# Patient Record
Sex: FEMALE | Race: WHITE | NOT HISPANIC OR LATINO | Employment: OTHER | URBAN - METROPOLITAN AREA
[De-identification: names, ages, dates, MRNs, and addresses within clinical notes are randomized per-mention and may not be internally consistent; named-entity substitution may affect disease eponyms.]

---

## 2017-09-26 ENCOUNTER — CONVERSION ENCOUNTER (OUTPATIENT)
Dept: RADIOLOGY | Facility: IMAGING CENTER | Age: 72
End: 2017-09-26

## 2018-12-04 ENCOUNTER — TRANSCRIBE ORDERS (OUTPATIENT)
Dept: ADMINISTRATIVE | Facility: HOSPITAL | Age: 73
End: 2018-12-04

## 2018-12-04 DIAGNOSIS — Z12.39 BREAST SCREENING: Primary | ICD-10-CM

## 2019-01-07 ENCOUNTER — HOSPITAL ENCOUNTER (OUTPATIENT)
Dept: RADIOLOGY | Age: 74
Discharge: HOME | End: 2019-01-07
Payer: COMMERCIAL

## 2019-01-07 VITALS — HEIGHT: 59 IN | WEIGHT: 112 LBS | BODY MASS INDEX: 22.58 KG/M2

## 2019-01-07 DIAGNOSIS — Z12.39 BREAST SCREENING: ICD-10-CM

## 2019-01-07 PROCEDURE — 77063 BREAST TOMOSYNTHESIS BI: CPT

## 2019-01-07 PROCEDURE — 77067 SCR MAMMO BI INCL CAD: CPT

## 2020-10-07 ENCOUNTER — TRANSCRIBE ORDERS (OUTPATIENT)
Dept: ADMINISTRATIVE | Facility: HOSPITAL | Age: 75
End: 2020-10-07

## 2020-10-07 DIAGNOSIS — Z12.31 SCREENING MAMMOGRAM FOR HIGH-RISK PATIENT: Primary | ICD-10-CM

## 2021-01-04 ENCOUNTER — HOSPITAL ENCOUNTER (OUTPATIENT)
Dept: MAMMOGRAPHY | Facility: CLINIC | Age: 76
Discharge: HOME | End: 2021-01-04
Payer: COMMERCIAL

## 2021-01-04 VITALS — HEIGHT: 59 IN | WEIGHT: 116 LBS | BODY MASS INDEX: 23.39 KG/M2

## 2021-01-04 DIAGNOSIS — Z12.31 ENCOUNTER FOR SCREENING MAMMOGRAM FOR MALIGNANT NEOPLASM OF BREAST: ICD-10-CM

## 2021-01-04 DIAGNOSIS — Z12.31 SCREENING MAMMOGRAM FOR HIGH-RISK PATIENT: ICD-10-CM

## 2021-01-04 PROCEDURE — 77067 SCR MAMMO BI INCL CAD: CPT

## 2021-01-04 PROCEDURE — 77063 BREAST TOMOSYNTHESIS BI: CPT

## 2023-07-13 ENCOUNTER — HOSPITAL ENCOUNTER (OUTPATIENT)
Dept: MAMMOGRAPHY | Facility: CLINIC | Age: 78
End: 2023-07-13
Payer: COMMERCIAL

## 2023-07-13 VITALS — BODY MASS INDEX: 24.19 KG/M2 | HEIGHT: 59 IN | WEIGHT: 120 LBS

## 2023-07-13 DIAGNOSIS — Z12.31 ENCOUNTER FOR SCREENING MAMMOGRAM FOR MALIGNANT NEOPLASM OF BREAST: ICD-10-CM

## 2023-07-13 PROCEDURE — 77067 SCR MAMMO BI INCL CAD: CPT

## 2023-07-13 PROCEDURE — 77063 BREAST TOMOSYNTHESIS BI: CPT

## 2023-08-11 ENCOUNTER — NURSING HOME VISIT (OUTPATIENT)
Dept: GERIATRICS | Facility: OTHER | Age: 78
End: 2023-08-11
Payer: COMMERCIAL

## 2023-08-11 DIAGNOSIS — R19.7 DIARRHEA, UNSPECIFIED TYPE: ICD-10-CM

## 2023-08-11 DIAGNOSIS — I10 ESSENTIAL HYPERTENSION: ICD-10-CM

## 2023-08-11 DIAGNOSIS — J18.9 PNEUMONIA DUE TO INFECTIOUS ORGANISM, UNSPECIFIED LATERALITY, UNSPECIFIED PART OF LUNG: Primary | ICD-10-CM

## 2023-08-11 DIAGNOSIS — F41.1 ANXIETY STATE: ICD-10-CM

## 2023-08-11 DIAGNOSIS — M05.79 RHEUMATOID ARTHRITIS INVOLVING MULTIPLE SITES WITH POSITIVE RHEUMATOID FACTOR (HCC): ICD-10-CM

## 2023-08-11 DIAGNOSIS — D64.9 ANEMIA, UNSPECIFIED TYPE: ICD-10-CM

## 2023-08-11 DIAGNOSIS — R53.1 GENERALIZED WEAKNESS: ICD-10-CM

## 2023-08-11 DIAGNOSIS — D47.2 SMOLDERING MYELOMA: ICD-10-CM

## 2023-08-11 DIAGNOSIS — K21.9 GASTROESOPHAGEAL REFLUX DISEASE, UNSPECIFIED WHETHER ESOPHAGITIS PRESENT: ICD-10-CM

## 2023-08-11 DIAGNOSIS — N17.9 AKI (ACUTE KIDNEY INJURY) (HCC): ICD-10-CM

## 2023-08-11 PROBLEM — M06.9: Status: ACTIVE | Noted: 2022-11-17

## 2023-08-11 PROBLEM — M17.10 ARTHRITIS OF KNEE: Status: ACTIVE | Noted: 2022-04-14

## 2023-08-11 PROBLEM — M25.50 MULTIPLE JOINT PAIN: Status: ACTIVE | Noted: 2021-11-16

## 2023-08-11 PROBLEM — R00.0 TACHYCARDIA: Status: ACTIVE | Noted: 2021-12-06

## 2023-08-11 PROBLEM — N25.81 HYPERPARATHYROIDISM DUE TO RENAL INSUFFICIENCY (HCC): Status: ACTIVE | Noted: 2019-11-14

## 2023-08-11 PROBLEM — R74.01 ELEVATED AST (SGOT): Status: ACTIVE | Noted: 2023-08-02

## 2023-08-11 PROBLEM — R74.8 ALKALINE PHOSPHATASE ELEVATION: Status: ACTIVE | Noted: 2023-08-02

## 2023-08-11 PROBLEM — R77.8 ELEVATED TROPONIN: Status: ACTIVE | Noted: 2023-08-02

## 2023-08-11 PROBLEM — N18.1 STAGE 1 CHRONIC KIDNEY DISEASE: Status: ACTIVE | Noted: 2019-11-14

## 2023-08-11 PROBLEM — A41.9 SEPSIS (HCC): Status: ACTIVE | Noted: 2023-08-03

## 2023-08-11 PROBLEM — R06.02 SOB (SHORTNESS OF BREATH): Status: ACTIVE | Noted: 2023-08-02

## 2023-08-11 PROBLEM — R30.0 DIFFICULT OR PAINFUL URINATION: Status: ACTIVE | Noted: 2021-12-06

## 2023-08-11 PROBLEM — G89.29 CHRONIC LOW BACK PAIN: Status: ACTIVE | Noted: 2021-11-16

## 2023-08-11 PROBLEM — M17.0 PRIMARY OSTEOARTHRITIS OF BOTH KNEES: Status: ACTIVE | Noted: 2022-01-04

## 2023-08-11 PROBLEM — N26.1 ATROPHY OF KIDNEY: Status: ACTIVE | Noted: 2019-11-14

## 2023-08-11 PROBLEM — M54.50 CHRONIC LOW BACK PAIN: Status: ACTIVE | Noted: 2021-11-16

## 2023-08-11 PROBLEM — C90.00 MULTIPLE MYELOMA NOT HAVING ACHIEVED REMISSION (HCC): Status: ACTIVE | Noted: 2023-04-14

## 2023-08-11 PROBLEM — M51.36 DDD (DEGENERATIVE DISC DISEASE), LUMBAR: Status: ACTIVE | Noted: 2022-01-04

## 2023-08-11 PROBLEM — M85.89 OSTEOPENIA OF MULTIPLE SITES: Status: ACTIVE | Noted: 2022-08-30

## 2023-08-11 PROBLEM — E04.1 THYROID NODULE: Status: ACTIVE | Noted: 2022-10-04

## 2023-08-11 PROBLEM — E55.9 VITAMIN D DEFICIENCY: Status: ACTIVE | Noted: 2019-11-14

## 2023-08-11 PROCEDURE — 99306 1ST NF CARE HIGH MDM 50: CPT | Performed by: INTERNAL MEDICINE

## 2023-08-11 RX ORDER — LOSARTAN POTASSIUM 50 MG/1
50 TABLET ORAL DAILY
COMMUNITY
Start: 2023-07-28

## 2023-08-11 RX ORDER — DIMENHYDRINATE 50 MG
1000 TABLET ORAL DAILY
COMMUNITY

## 2023-08-11 RX ORDER — HYDROXYCHLOROQUINE SULFATE 200 MG/1
300 TABLET, FILM COATED ORAL DAILY
COMMUNITY
Start: 2023-02-28

## 2023-08-11 RX ORDER — FERROUS SULFATE 325(65) MG
325 TABLET ORAL
COMMUNITY

## 2023-08-11 RX ORDER — FAMOTIDINE 20 MG/1
1 TABLET, FILM COATED ORAL DAILY
COMMUNITY
Start: 2023-06-12

## 2023-08-11 RX ORDER — ACETAMINOPHEN 500 MG
650 TABLET ORAL 2 TIMES DAILY
COMMUNITY

## 2023-08-11 RX ORDER — CARVEDILOL 6.25 MG/1
6.25 TABLET ORAL 2 TIMES DAILY
COMMUNITY
Start: 2023-07-12

## 2023-08-11 RX ORDER — AMOXICILLIN 500 MG
1 CAPSULE ORAL DAILY
COMMUNITY

## 2023-08-11 RX ORDER — AMLODIPINE BESYLATE 2.5 MG/1
1 TABLET ORAL DAILY
COMMUNITY

## 2023-08-11 NOTE — ASSESSMENT & PLAN NOTE
Cr. Improved with hydration  Continue current meds  Follows up with nephrology as out patient due to one kidney being atrophy   Daughter reports has poor intake

## 2023-08-11 NOTE — ASSESSMENT & PLAN NOTE
Continue current med  Continue monitoring symptoms   PT/OT eval  Mostly likely due to pneumonia and dehydration

## 2023-08-11 NOTE — PROGRESS NOTES
[facility] home notes  SHORT TERM REHAB       NAME: Tani Carvajal  AGE: 65 y.o. SEX: female    DATE OF ENCOUNTER: 8/11/2023    Assessment and Plan   Pneumonia due to infectious organism  Completed course of antibiotics   Continue to encourage deep breaths  Taper off of oxygen  Continue monitoring symptoms     Generalized weakness  Continue current med  Continue monitoring symptoms   PT/OT eval  Mostly likely due to pneumonia and dehydration    ROOPA (acute kidney injury) ([address])  Cr. Improved with hydration  Continue current meds  Follows up with nephrology as out patient due to one kidney being atrophy   Daughter reports has poor intake     Anxiety state  Continue with supportive care  Currently not on meds   Continue monitoring symptoms     Essential hypertension  BP meds adjusted  Continue current med  Continue monitoring BP    Rheumatoid arthritis involving multiple sites with positive rheumatoid factor ([address])  Continue current meds  Continue monitoring symptoms      Smoldering myeloma  Continue follow up with nephrology   Continue monitoring symptoms     GERD (gastroesophageal reflux disease)  Continue current med  Continue monitoring symptoms     Diarrhea  Seems to have resolved   Continue monitoring symptoms    Anemia  Hb 9.8  Start iron supplement as patient poor eating habits as per daughter   Will repeat CBC        Chief Complaint     No new complaints    History of Present Illness     66 yo female seen for admission to [address] [place] [address] after hospitalization. As per history obtained from patient she was out side working and her daughter reported that she looks dehydrated and brought her to the hospital. Patient also reports she recently had her BP medications changed and then having diarrhea but not sure if it was associated to each other. It was another reason she was dehydrated but diarrhea has now resolved. Daughter reports her mother has not been eating well or drinking well.  She states her mother has poor eating habits which maybe another reason she felt weak and dehydrated. Patient was managed for dehydration, ROOPA and pneumonia in the hospital then discharged to [address] [place] [address] for therapy. Reviewed labs and imaging reports from the hospital records.      PMHx     Past Medical History:   Diagnosis Date   • Anxiety    • Arthritis    • Hyperlipidemia    • Hypertension    • Macular degeneration    • Proteinuria    • Rheumatoid arthritis ([address])    • Smoldering multiple myeloma      Past Surgical History:   Procedure Laterality Date   • ABDOMINAL SACROCOLPOPEXY     • APPENDECTOMY     • CATARACT EXTRACTION     • COLPORRHAPHY     • HEMORRHOID SURGERY     • HYSTERECTOMY      age 71   • JOINT REPLACEMENT     • OOPHORECTOMY     • TONSILECTOMY AND ADNOIDECTOMY       Family History   Problem Relation Age of Onset   • Hypertension Mother    • Tuberculosis Father    • Emphysema Brother    • No Known Problems Son    • No Known Problems Daughter    • No Known Problems Maternal Grandmother    • No Known Problems Maternal Grandfather    • No Known Problems Paternal Grandmother    • No Known Problems Paternal Grandfather      Social History     Socioeconomic History   • Marital status: /Civil Union     Spouse name: None   • Number of children: None   • Years of education: None   • Highest education level: None   Occupational History   • None   Tobacco Use   • Smoking status: Never   • Smokeless tobacco: Never   Substance and Sexual Activity   • Alcohol use: Never   • Drug use: Never   • Sexual activity: None   Other Topics Concern   • None   Social History Narrative   • None     Social Determinants of Health     Financial Resource Strain: Not on file   Food Insecurity: Not on file   Transportation Needs: Not on file   Physical Activity: Not on file   Stress: Not on file   Social Connections: Not on file   Intimate Partner Violence: Not on file   Housing Stability: Not on file     Allergies   Allergen Reactions   • Celecoxib Other (See Comments) and Rash     Advised not to take by kidney specialist  Advised not to take by kidney specialist     • Ketorolac Other (See Comments)     Patient reports- -per Kidney specialist  Patient reports- -per Kidney specialist     • Codeine Other (See Comments)     Crying spell  Crying spell     • Oxycodone GI Intolerance       Review of Systems     Denies any pain or shortness of breath   All other review of system negative        Objective   Vital signs:  BP: 112/65  HR: 97  RR: 18  TEMP: 98.7F  SAT.: 98% on RA    PHYSICAL EXAM:  GENERAL: no acute distress  SKIN: warm, dry, no rash, no cyanosis  HEENT: normocephalic, atraumatic, no JVD, no Thyromegaly, no lymphadenopathy  LUNGS: CTA, no wheezing, no rales, expanded equally, no chest tenderness   HEART: normal rhythm, normal rate, no murmur, no gallop  ABDOMEN: soft non tender non distended bs+, no guarding or rebound tenderness  :  no suprapubic tenderness  MUSCULOSKELETAL: strength about 4+/5 all extremities, ROM within normal, no calf tenderness  NEUROLOGY: awake, alert, Ox3, unable to recall 3 object. CN2-12 intact. PSYCH: cooperative, pleasant. Pertinent Laboratory/Diagnostic Studies:  Recent labs and diagnostic tests reviewed in nursing home EMR    Current Medications   Medications reviewed and signed off on nursing home EMR.

## 2023-08-11 NOTE — ASSESSMENT & PLAN NOTE
Completed course of antibiotics   Continue to encourage deep breaths  Taper off of oxygen  Continue monitoring symptoms

## 2023-08-18 ENCOUNTER — NURSING HOME VISIT (OUTPATIENT)
Dept: GERIATRICS | Facility: OTHER | Age: 78
End: 2023-08-18
Payer: COMMERCIAL

## 2023-08-18 DIAGNOSIS — J18.9 PNEUMONIA DUE TO INFECTIOUS ORGANISM, UNSPECIFIED LATERALITY, UNSPECIFIED PART OF LUNG: ICD-10-CM

## 2023-08-18 DIAGNOSIS — D64.9 ANEMIA, UNSPECIFIED TYPE: ICD-10-CM

## 2023-08-18 DIAGNOSIS — R53.1 GENERALIZED WEAKNESS: Primary | ICD-10-CM

## 2023-08-18 DIAGNOSIS — N17.9 AKI (ACUTE KIDNEY INJURY) (HCC): ICD-10-CM

## 2023-08-18 PROCEDURE — 99309 SBSQ NF CARE MODERATE MDM 30: CPT | Performed by: INTERNAL MEDICINE

## 2023-08-18 NOTE — ASSESSMENT & PLAN NOTE
Repeat hb 9.7   No signs of bleeding   Continue iron supplement   Continue monitoring CBC as out patient

## 2023-08-18 NOTE — ASSESSMENT & PLAN NOTE
Seems to be doing better  Off of oxygen  Continue monitoring symptoms   Continue monitoring symptoms

## 2023-08-18 NOTE — PROGRESS NOTES
[facility] [address]  home notes  SHORT TERM REHAB       NAME: Lewis Pimentel  AGE: 65 y.o. SEX: female    DATE OF ENCOUNTER: 8/18/2023    Assessment and Plan   Generalized weakness  Multifactorial  Continues to work with therapy  Reviewed therapy notes  Discussed with therapy about patient  Continue with safeyty measures  Continue with fall precautions     Anemia  Repeat hb 9.7   No signs of bleeding   Continue iron supplement   Continue monitoring CBC as out patient     ROOPA (acute kidney injury) ([address])  Cr normal  Continue with current med  Continue follow up with nephrology as out patient     Pneumonia due to infectious organism  Seems to be doing better  Off of oxygen  Continue monitoring symptoms   Continue monitoring symptoms      Chief Complaint     No new complaints     History of Present Illness     64 yo female seen for follow up. Patient seen for generalized weakness, ROOPA and anemia. Reviewed nursing notes since last visit.      PMHx     Past Medical History:   Diagnosis Date   • Anxiety    • Arthritis    • Hyperlipidemia    • Hypertension    • Macular degeneration    • Proteinuria    • Rheumatoid arthritis ([address])    • Smoldering multiple myeloma      Past Surgical History:   Procedure Laterality Date   • ABDOMINAL SACROCOLPOPEXY     • APPENDECTOMY     • CATARACT EXTRACTION     • COLPORRHAPHY     • HEMORRHOID SURGERY     • HYSTERECTOMY      age 71   • JOINT REPLACEMENT     • OOPHORECTOMY     • TONSILECTOMY AND ADNOIDECTOMY       Family History   Problem Relation Age of Onset   • Hypertension Mother    • Tuberculosis Father    • Emphysema Brother    • No Known Problems Son    • No Known Problems Daughter    • No Known Problems Maternal Grandmother    • No Known Problems Maternal Grandfather    • No Known Problems Paternal Grandmother    • No Known Problems Paternal Grandfather      Social History     Socioeconomic History   • Marital status: /Civil Union     Spouse name: Not on file   • Number of children: Not on file   • Years of education: Not on file   • Highest education level: Not on file   Occupational History   • Not on file   Tobacco Use   • Smoking status: Never   • Smokeless tobacco: Never   Substance and Sexual Activity   • Alcohol use: Never   • Drug use: Never   • Sexual activity: Not on file   Other Topics Concern   • Not on file   Social History Narrative   • Not on file     Social Determinants of Health     Financial Resource Strain: Not on file   Food Insecurity: Not on file   Transportation Needs: Not on file   Physical Activity: Not on file   Stress: Not on file   Social Connections: Not on file   Intimate Partner Violence: Not on file   Housing Stability: Not on file     Allergies   Allergen Reactions   • Celecoxib Other (See Comments) and Rash     Advised not to take by kidney specialist  Advised not to take by kidney specialist     • Ketorolac Other (See Comments)     Patient reports- -per Kidney specialist  Patient reports- -per Kidney specialist     • Codeine Other (See Comments)     Crying spell  Crying spell     • Oxycodone GI Intolerance       Review of Systems     Denies any pain or shortness of breath   All other review of system negative        Objective   Vital signs reviewed from facility records. PHYSICAL EXAM:  GENERAL: no acute distress  SKIN: warm, dry, no rash, no cyanosis  HEENT: normocephalic, atraumatic, no JVD, no Thyromegaly, no lymphadenopathy  LUNGS: CTA, no wheezing, no rales, expanded equally, no chest tenderness   HEART: normal rhythm, normal rate, no murmur, no gallop  ABDOMEN: soft non tender non distended bs+, no guarding or rebound tenderness  :  no suprapubic tenderness  MUSCULOSKELETAL: strength about 4+/5 all extremities, ROM within normal, no calf tenderness  NEUROLOGY: awake, alert, Ox3, CN2-12 intact. PSYCH: cooperative, pleasant.        Pertinent Laboratory/Diagnostic Studies:  Recent labs and diagnostic tests reviewed in nursing home EMR    Current Medications   Medications reviewed and signed off on nursing home EMR.

## 2023-08-22 ENCOUNTER — NURSING HOME VISIT (OUTPATIENT)
Dept: GERIATRICS | Facility: OTHER | Age: 78
End: 2023-08-22
Payer: COMMERCIAL

## 2023-08-22 DIAGNOSIS — K21.9 GASTROESOPHAGEAL REFLUX DISEASE, UNSPECIFIED WHETHER ESOPHAGITIS PRESENT: ICD-10-CM

## 2023-08-22 DIAGNOSIS — F41.1 ANXIETY STATE: ICD-10-CM

## 2023-08-22 DIAGNOSIS — I10 ESSENTIAL HYPERTENSION: ICD-10-CM

## 2023-08-22 DIAGNOSIS — D64.9 ANEMIA, UNSPECIFIED TYPE: ICD-10-CM

## 2023-08-22 DIAGNOSIS — D47.2 SMOLDERING MYELOMA: ICD-10-CM

## 2023-08-22 DIAGNOSIS — M05.79 RHEUMATOID ARTHRITIS INVOLVING MULTIPLE SITES WITH POSITIVE RHEUMATOID FACTOR (HCC): ICD-10-CM

## 2023-08-22 DIAGNOSIS — J18.9 PNEUMONIA DUE TO INFECTIOUS ORGANISM, UNSPECIFIED LATERALITY, UNSPECIFIED PART OF LUNG: Primary | ICD-10-CM

## 2023-08-22 DIAGNOSIS — R53.1 GENERALIZED WEAKNESS: ICD-10-CM

## 2023-08-22 DIAGNOSIS — N17.9 AKI (ACUTE KIDNEY INJURY) (HCC): ICD-10-CM

## 2023-08-22 PROCEDURE — 99316 NF DSCHRG MGMT 30 MIN+: CPT | Performed by: INTERNAL MEDICINE

## 2023-08-22 NOTE — ASSESSMENT & PLAN NOTE
Seems to be recovering well  Continue with activity   Off of oxygen  Continue monitoring symptoms and vitals

## 2023-08-22 NOTE — ASSESSMENT & PLAN NOTE
Multifactorial   Reviewed therapy notes  Discussed with therapy about patient   Continue with safety measures  Continue with fall precautions

## 2023-08-22 NOTE — ASSESSMENT & PLAN NOTE
Cr normal  Continue monitoring as out patient  Continue to encourage fluids   Continue follow up with nephrology as out patient

## 2023-08-22 NOTE — ASSESSMENT & PLAN NOTE
Hb stable around 9.7  Continue monitoring for signs of bleeding  No signs of bleeding  Continue iron supplement

## 2023-08-22 NOTE — PROGRESS NOTES
[facility] [address]  home notes  SHORT TERM REHAB Discharge summary      NAME: Nelli Bay  AGE: 65 y.o. SEX: female    DATE OF ENCOUNTER: 8/22/2023    Assessment and Plan   Pneumonia due to infectious organism  Seems to be recovering well  Continue with activity   Off of oxygen  Continue monitoring symptoms and vitals     Generalized weakness  Multifactorial   Reviewed therapy notes  Discussed with therapy about patient   Continue with safety measures  Continue with fall precautions     GERD (gastroesophageal reflux disease)  Seems stable  Continue current med  Continue monitoring symptoms     Essential hypertension  BP reviewed from facility records, acceptable range  Continue current med  Continue monitoring BP    ROOPA (acute kidney injury) ([address])  Cr normal  Continue monitoring as out patient  Continue to encourage fluids   Continue follow up with nephrology as out patient     Anemia  Hb stable around 9.7  Continue monitoring for signs of bleeding  No signs of bleeding  Continue iron supplement     Anxiety state  Currently not on meds   Continue monitoring symptoms       Smoldering myeloma  Continue follow up with nephrology  Continue monitoring symptoms     Rheumatoid arthritis involving multiple sites with positive rheumatoid factor ([address])  Continue current med  Continue monitoring symptoms       Chief Complaint     No new complaints    History of Present Illness     64 yo female seen for discharge. Patient was admitted to [facility] after hospitalization. Patient was managed at the hospital for dehydration, pneumonia and ROOPA. Once stable she was discharged to [facility] for therapy. Patient continued with therapy and now ready for discharge. Reviewed nursing notes since last visit.      PMHx     Past Medical History:   Diagnosis Date   • Anxiety    • Arthritis    • Hyperlipidemia    • Hypertension    • Macular degeneration    • Proteinuria    • Rheumatoid arthritis ([address])    • Smoldering multiple myeloma      Past Surgical History:   Procedure Laterality Date   • ABDOMINAL SACROCOLPOPEXY     • APPENDECTOMY     • CATARACT EXTRACTION     • COLPORRHAPHY     • HEMORRHOID SURGERY     • HYSTERECTOMY      age 71   • JOINT REPLACEMENT     • OOPHORECTOMY     • TONSILECTOMY AND ADNOIDECTOMY       Family History   Problem Relation Age of Onset   • Hypertension Mother    • Tuberculosis Father    • Emphysema Brother    • No Known Problems Son    • No Known Problems Daughter    • No Known Problems Maternal Grandmother    • No Known Problems Maternal Grandfather    • No Known Problems Paternal Grandmother    • No Known Problems Paternal Grandfather      Social History     Socioeconomic History   • Marital status: /Civil Union     Spouse name: Not on file   • Number of children: Not on file   • Years of education: Not on file   • Highest education level: Not on file   Occupational History   • Not on file   Tobacco Use   • Smoking status: Never   • Smokeless tobacco: Never   Substance and Sexual Activity   • Alcohol use: Never   • Drug use: Never   • Sexual activity: Not on file   Other Topics Concern   • Not on file   Social History Narrative   • Not on file     Social Determinants of Health     Financial Resource Strain: Not on file   Food Insecurity: Not on file   Transportation Needs: Not on file   Physical Activity: Not on file   Stress: Not on file   Social Connections: Not on file   Intimate Partner Violence: Not on file   Housing Stability: Not on file     Allergies   Allergen Reactions   • Celecoxib Other (See Comments) and Rash     Advised not to take by kidney specialist  Advised not to take by kidney specialist     • Ketorolac Other (See Comments)     Patient reports- -per Kidney specialist  Patient reports- -per Kidney specialist     • Codeine Other (See Comments)     Crying spell  Crying spell     • Oxycodone GI Intolerance       Review of Systems     Denies any pain or shortness of breath   All other review of system negative      Objective   Vital signs reviewed from facility records. PHYSICAL EXAM:  GENERAL: no acute distress  SKIN: warm, dry, no rash, no cyanosis  HEENT: normocephalic, atraumatic, no JVD, no Thyromegaly, no lymphadenopathy  LUNGS: CTA, no wheezing, no rales, expanded equally, no chest tenderness   HEART: normal rhythm, normal rate, no murmur, no gallop  ABDOMEN: soft non tender non distended bs+, no guarding or rebound tenderness  :  no suprapubic tenderness  MUSCULOSKELETAL: strength about 4+/5 all extremities, ROM within normal, no calf tenderness  NEUROLOGY: awake, alert, Ox3, CN2-12 intact. PSYCH: cooperative, pleasant.        Pertinent Laboratory/Diagnostic Studies:  Recent labs and diagnostic tests reviewed in nursing home EMR    Current Medications  Medications reviewed with patient/family  Prescriptions provided for medications needed    Prescriptions provided for services needed    Time spend on patient discharge 35 minutes